# Patient Record
Sex: FEMALE | Race: WHITE | NOT HISPANIC OR LATINO | URBAN - NONMETROPOLITAN AREA
[De-identification: names, ages, dates, MRNs, and addresses within clinical notes are randomized per-mention and may not be internally consistent; named-entity substitution may affect disease eponyms.]

---
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
## 2022-04-27 ENCOUNTER — OFFICE VISIT (OUTPATIENT)
Dept: MEDICAL GROUP | Facility: CLINIC | Age: 22
End: 2022-04-27
Payer: OTHER MISCELLANEOUS

## 2022-04-27 VITALS
DIASTOLIC BLOOD PRESSURE: 74 MMHG | BODY MASS INDEX: 34.49 KG/M2 | TEMPERATURE: 97.7 F | OXYGEN SATURATION: 97 % | HEIGHT: 65 IN | HEART RATE: 97 BPM | RESPIRATION RATE: 20 BRPM | SYSTOLIC BLOOD PRESSURE: 118 MMHG | WEIGHT: 207 LBS

## 2022-04-27 DIAGNOSIS — Z30.41 ENCOUNTER FOR SURVEILLANCE OF CONTRACEPTIVE PILLS: ICD-10-CM

## 2022-04-27 DIAGNOSIS — R21 RASH: ICD-10-CM

## 2022-04-27 PROCEDURE — 99203 OFFICE O/P NEW LOW 30 MIN: CPT | Performed by: PHYSICIAN ASSISTANT

## 2022-04-27 RX ORDER — NORETHINDRONE ACETATE AND ETHINYL ESTRADIOL 1MG-20(21)
1 KIT ORAL
Qty: 90 TABLET | Refills: 3 | Status: SHIPPED | OUTPATIENT
Start: 2022-04-27 | End: 2023-03-06 | Stop reason: SDUPTHER

## 2022-04-27 RX ORDER — LORATADINE 10 MG/1
10 TABLET ORAL DAILY
Qty: 90 TABLET | Refills: 2 | Status: SHIPPED | OUTPATIENT
Start: 2022-04-27 | End: 2023-09-06

## 2022-04-27 RX ORDER — NORETHINDRONE ACETATE AND ETHINYL ESTRADIOL 1MG-20(21)
1 KIT ORAL
COMMUNITY
Start: 2022-03-28 | End: 2022-04-27 | Stop reason: SDUPTHER

## 2022-04-27 SDOH — HEALTH STABILITY: PHYSICAL HEALTH: ON AVERAGE, HOW MANY MINUTES DO YOU ENGAGE IN EXERCISE AT THIS LEVEL?: 10 MIN

## 2022-04-27 SDOH — ECONOMIC STABILITY: FOOD INSECURITY: WITHIN THE PAST 12 MONTHS, YOU WORRIED THAT YOUR FOOD WOULD RUN OUT BEFORE YOU GOT MONEY TO BUY MORE.: NEVER TRUE

## 2022-04-27 SDOH — ECONOMIC STABILITY: INCOME INSECURITY: IN THE LAST 12 MONTHS, WAS THERE A TIME WHEN YOU WERE NOT ABLE TO PAY THE MORTGAGE OR RENT ON TIME?: NO

## 2022-04-27 SDOH — ECONOMIC STABILITY: FOOD INSECURITY: WITHIN THE PAST 12 MONTHS, THE FOOD YOU BOUGHT JUST DIDN'T LAST AND YOU DIDN'T HAVE MONEY TO GET MORE.: NEVER TRUE

## 2022-04-27 SDOH — ECONOMIC STABILITY: HOUSING INSECURITY: IN THE LAST 12 MONTHS, HOW MANY PLACES HAVE YOU LIVED?: 3

## 2022-04-27 SDOH — HEALTH STABILITY: PHYSICAL HEALTH: ON AVERAGE, HOW MANY DAYS PER WEEK DO YOU ENGAGE IN MODERATE TO STRENUOUS EXERCISE (LIKE A BRISK WALK)?: 1 DAY

## 2022-04-27 SDOH — ECONOMIC STABILITY: HOUSING INSECURITY
IN THE LAST 12 MONTHS, WAS THERE A TIME WHEN YOU DID NOT HAVE A STEADY PLACE TO SLEEP OR SLEPT IN A SHELTER (INCLUDING NOW)?: NO

## 2022-04-27 SDOH — ECONOMIC STABILITY: INCOME INSECURITY: HOW HARD IS IT FOR YOU TO PAY FOR THE VERY BASICS LIKE FOOD, HOUSING, MEDICAL CARE, AND HEATING?: NOT VERY HARD

## 2022-04-27 SDOH — ECONOMIC STABILITY: TRANSPORTATION INSECURITY
IN THE PAST 12 MONTHS, HAS THE LACK OF TRANSPORTATION KEPT YOU FROM MEDICAL APPOINTMENTS OR FROM GETTING MEDICATIONS?: NO

## 2022-04-27 SDOH — HEALTH STABILITY: MENTAL HEALTH
STRESS IS WHEN SOMEONE FEELS TENSE, NERVOUS, ANXIOUS, OR CAN'T SLEEP AT NIGHT BECAUSE THEIR MIND IS TROUBLED. HOW STRESSED ARE YOU?: PATIENT DECLINED

## 2022-04-27 SDOH — ECONOMIC STABILITY: TRANSPORTATION INSECURITY
IN THE PAST 12 MONTHS, HAS LACK OF TRANSPORTATION KEPT YOU FROM MEETINGS, WORK, OR FROM GETTING THINGS NEEDED FOR DAILY LIVING?: NO

## 2022-04-27 SDOH — ECONOMIC STABILITY: TRANSPORTATION INSECURITY
IN THE PAST 12 MONTHS, HAS LACK OF RELIABLE TRANSPORTATION KEPT YOU FROM MEDICAL APPOINTMENTS, MEETINGS, WORK OR FROM GETTING THINGS NEEDED FOR DAILY LIVING?: NO

## 2022-04-27 ASSESSMENT — SOCIAL DETERMINANTS OF HEALTH (SDOH)
IN A TYPICAL WEEK, HOW MANY TIMES DO YOU TALK ON THE PHONE WITH FAMILY, FRIENDS, OR NEIGHBORS?: MORE THAN THREE TIMES A WEEK
DO YOU BELONG TO ANY CLUBS OR ORGANIZATIONS SUCH AS CHURCH GROUPS UNIONS, FRATERNAL OR ATHLETIC GROUPS, OR SCHOOL GROUPS?: NO
HOW OFTEN DO YOU GET TOGETHER WITH FRIENDS OR RELATIVES?: MORE THAN THREE TIMES A WEEK
HOW OFTEN DO YOU ATTEND CHURCH OR RELIGIOUS SERVICES?: 1 TO 4 TIMES PER YEAR
WITHIN THE PAST 12 MONTHS, YOU WORRIED THAT YOUR FOOD WOULD RUN OUT BEFORE YOU GOT THE MONEY TO BUY MORE: NEVER TRUE
ARE YOU MARRIED, WIDOWED, DIVORCED, SEPARATED, NEVER MARRIED, OR LIVING WITH A PARTNER?: PATIENT DECLINED
HOW OFTEN DO YOU ATTEND CHURCH OR RELIGIOUS SERVICES?: 1 TO 4 TIMES PER YEAR
HOW OFTEN DO YOU HAVE SIX OR MORE DRINKS ON ONE OCCASION: NEVER
HOW OFTEN DO YOU ATTENT MEETINGS OF THE CLUB OR ORGANIZATION YOU BELONG TO?: NEVER
HOW OFTEN DO YOU HAVE A DRINK CONTAINING ALCOHOL: NEVER
DO YOU BELONG TO ANY CLUBS OR ORGANIZATIONS SUCH AS CHURCH GROUPS UNIONS, FRATERNAL OR ATHLETIC GROUPS, OR SCHOOL GROUPS?: NO
IN A TYPICAL WEEK, HOW MANY TIMES DO YOU TALK ON THE PHONE WITH FAMILY, FRIENDS, OR NEIGHBORS?: MORE THAN THREE TIMES A WEEK
HOW OFTEN DO YOU ATTENT MEETINGS OF THE CLUB OR ORGANIZATION YOU BELONG TO?: NEVER
HOW HARD IS IT FOR YOU TO PAY FOR THE VERY BASICS LIKE FOOD, HOUSING, MEDICAL CARE, AND HEATING?: NOT VERY HARD
ARE YOU MARRIED, WIDOWED, DIVORCED, SEPARATED, NEVER MARRIED, OR LIVING WITH A PARTNER?: PATIENT DECLINED
HOW OFTEN DO YOU GET TOGETHER WITH FRIENDS OR RELATIVES?: MORE THAN THREE TIMES A WEEK
HOW MANY DRINKS CONTAINING ALCOHOL DO YOU HAVE ON A TYPICAL DAY WHEN YOU ARE DRINKING: PATIENT DECLINED

## 2022-04-27 ASSESSMENT — PATIENT HEALTH QUESTIONNAIRE - PHQ9: CLINICAL INTERPRETATION OF PHQ2 SCORE: 0

## 2022-04-27 ASSESSMENT — LIFESTYLE VARIABLES
HOW OFTEN DO YOU HAVE SIX OR MORE DRINKS ON ONE OCCASION: NEVER
HOW OFTEN DO YOU HAVE A DRINK CONTAINING ALCOHOL: NEVER
HOW MANY STANDARD DRINKS CONTAINING ALCOHOL DO YOU HAVE ON A TYPICAL DAY: PATIENT DECLINED

## 2022-04-28 NOTE — PROGRESS NOTES
"cc:  ocp    Subjective:     Isabella Dasilva is a 21 y.o. female presenting for ocp      Patient presents to the office for ocp.  Patient was seeing a gynecologist for ocp medication for birth control and a previous history of an ovarian cyst.  She is not wanting to return but is needing a refill of the medication. She does have 4 days left of her medication and she has not missed any of her doses.       Patient states that she has a rash on her hands.  It comes and goes.  She states that symptoms are intermittent.  She states that she will have tiny blisters on her hand.  They will pop and scab.  She tried her sisters medication for dermatitis.  At first it helped.  The second time it burned and became worse.  Baby lotion burned also.  Benadryl did help.  She does have sensitive skin.      Review of systems:  See above.   Denies any symptoms unless previously indicated.        Current Outpatient Medications:   •  BLISOVI FE 1/20 1-20 MG-MCG per tablet, Take 1 Tablet by mouth every day., Disp: 90 Tablet, Rfl: 3  •  loratadine (CLARITIN) 10 MG Tab, Take 1 Tablet by mouth every day., Disp: 90 Tablet, Rfl: 2    Allergies, past medical history, past surgical history, family history, social history reviewed and updated    Objective:     Vitals: /74 (BP Location: Left arm, Patient Position: Sitting, BP Cuff Size: Large adult)   Pulse 97   Temp 36.5 °C (97.7 °F) (Temporal)   Resp 20   Ht 1.638 m (5' 4.5\")   Wt 93.9 kg (207 lb)   SpO2 97%   BMI 34.98 kg/m²   General: Alert, pleasant, NAD  EYES:   PERRL, EOMI, no icterus or pallor.  Conjunctivae and lids normal.   HENT:  Normocephalic.  External ears normal. Neck supple.   Respiratory: Normal respiratory effort.    Abdomen:   obese  Skin: Warm, dry, no rashes. No rash on hands.  Pictures show red hands with swelling of fingers.  Musculoskeletal: Gait is normal.  Moves all extremities well.    Neurological: No tremors, sensation grossly intact, CN2-12 " intact.  Psych:  Affect/mood is normal, judgement is good, memory is intact, grooming is appropriate.    Assessment/Plan:     Isabella was seen today for establish care, rash and medication refill.    Diagnoses and all orders for this visit:    Encounter for surveillance of contraceptive pills  -     BLISOVI FE 1/20 1-20 MG-MCG per tablet; Take 1 Tablet by mouth every day.    Patient has not missed any doses of her medication.  She still has 4 days of medication left.  We will refill at this time.    Rash  -     loratadine (CLARITIN) 10 MG Tab; Take 1 Tablet by mouth every day.  -     FOOD #1 (61)  -     ALLERGY ZONE 15  -     CBC WITH DIFFERENTIAL; Future      Etiology of this rash is unknown.  We discussed multiple options including medication, lab testing, referrals, use of steroids.  At this time patient would like to proceed with labs and medication.  Our plan will be to follow-up in 2 to 4 weeks with test results and reevaluation with medication use and she will contact me sooner if needed.      Return in about 4 weeks (around 5/25/2022), or if symptoms worsen or fail to improve, for 2-4 weeks.  .    Please note that this dictation was created using voice recognition software. I have made every reasonable attempt to correct obvious errors, but expect that there are errors of grammar and possible content that I did not discover before finalizing note.

## 2022-05-26 ENCOUNTER — OFFICE VISIT (OUTPATIENT)
Dept: MEDICAL GROUP | Facility: CLINIC | Age: 22
End: 2022-05-26

## 2022-05-26 ENCOUNTER — TELEPHONE (OUTPATIENT)
Dept: MEDICAL GROUP | Facility: CLINIC | Age: 22
End: 2022-05-26

## 2022-05-26 VITALS
HEART RATE: 76 BPM | HEIGHT: 64 IN | DIASTOLIC BLOOD PRESSURE: 70 MMHG | SYSTOLIC BLOOD PRESSURE: 116 MMHG | RESPIRATION RATE: 16 BRPM | TEMPERATURE: 98.1 F | WEIGHT: 209 LBS | OXYGEN SATURATION: 95 % | BODY MASS INDEX: 35.68 KG/M2

## 2022-05-26 DIAGNOSIS — R21 RASH: ICD-10-CM

## 2022-05-26 PROCEDURE — 99212 OFFICE O/P EST SF 10 MIN: CPT | Performed by: PHYSICIAN ASSISTANT

## 2022-05-26 NOTE — TELEPHONE ENCOUNTER
[facility] [place] and are not in the chart.  I need to have lab results hopefully by the end of the day.

## 2022-05-26 NOTE — PROGRESS NOTES
"cc: Rash    Subjective:     Isabella Dasilva is a 21 y.o. female presenting for rash    Patient presents to the office for rash.  She states that taking the claritin did help.  It flaired up one more time but the claritin cleared the rash.     Review of systems:  See above.   Denies any symptoms unless previously indicated.        Current Outpatient Medications:   •  BLISOVI FE 1/20 1-20 MG-MCG per tablet, Take 1 Tablet by mouth every day., Disp: 90 Tablet, Rfl: 3  •  loratadine (CLARITIN) 10 MG Tab, Take 1 Tablet by mouth every day., Disp: 90 Tablet, Rfl: 2    Allergies, past medical history, past surgical history, family history, social history reviewed and updated    Objective:     Vitals: /70 (BP Location: Left arm, Patient Position: Sitting, BP Cuff Size: Large adult)   Pulse 76   Temp 36.7 °C (98.1 °F) (Temporal)   Resp 16   Ht 1.626 m (5' 4\")   Wt 94.8 kg (209 lb)   LMP 05/25/2022 (Exact Date)   SpO2 95%   Breastfeeding No   BMI 35.87 kg/m²   General: Alert, pleasant, NAD  EYES:   PERRL, EOMI, no icterus or pallor.  Conjunctivae and lids normal.   HENT:  Normocephalic.  External ears normal.  Neck supple.  Respiratory: Normal respiratory effort.    Abdomen: Not obese  Skin: Warm, dry, no rashes.  Musculoskeletal: Gait is normal.  Moves all extremities well.    Extremities: normal range of motion all extremities.   Neurological: No tremors, sensation grossly intact, CN2-12 intact.  Psych:  Affect/mood is normal, judgement is good, memory is intact, grooming is appropriate.    Assessment/Plan:     Isabella was seen today for rash.    Diagnoses and all orders for this visit:    Rash    Improved with Claritin.  Continue with use as needed.  She can continue to take on a daily basis.  However her symptoms are not improving with Claritin, recommend referral to allergist.  Patient did have lab work done.  It is not currently in the chart.  Therefore we will try to notify her hopefully by the end of the " day with the test results.        Return if symptoms worsen or fail to improve.    Please note that this dictation was created using voice recognition software. I have made every reasonable attempt to correct obvious errors, but expect that there are errors of grammar and possible content that I did not discover before finalizing note.

## 2023-01-16 ENCOUNTER — APPOINTMENT (OUTPATIENT)
Dept: MEDICAL GROUP | Facility: CLINIC | Age: 23
End: 2023-01-16
Payer: COMMERCIAL

## 2023-01-18 ENCOUNTER — OFFICE VISIT (OUTPATIENT)
Dept: MEDICAL GROUP | Facility: CLINIC | Age: 23
End: 2023-01-18
Payer: COMMERCIAL

## 2023-01-18 VITALS
HEIGHT: 64 IN | HEART RATE: 91 BPM | RESPIRATION RATE: 16 BRPM | SYSTOLIC BLOOD PRESSURE: 118 MMHG | BODY MASS INDEX: 37.46 KG/M2 | OXYGEN SATURATION: 95 % | TEMPERATURE: 98 F | WEIGHT: 219.4 LBS | DIASTOLIC BLOOD PRESSURE: 72 MMHG

## 2023-01-18 DIAGNOSIS — R05.9 COUGH IN ADULT: ICD-10-CM

## 2023-01-18 DIAGNOSIS — J02.0 STREP PHARYNGITIS: ICD-10-CM

## 2023-01-18 LAB
EXTERNAL QUALITY CONTROL: NORMAL
FLUAV+FLUBV AG SPEC QL IA: NEGATIVE
INT CON NEG: NORMAL
INT CON POS: NORMAL
S PYO AG THROAT QL: POSITIVE
SARS-COV+SARS-COV-2 AG RESP QL IA.RAPID: NEGATIVE

## 2023-01-18 PROCEDURE — 87880 STREP A ASSAY W/OPTIC: CPT | Performed by: PHYSICIAN ASSISTANT

## 2023-01-18 PROCEDURE — 87426 SARSCOV CORONAVIRUS AG IA: CPT | Performed by: PHYSICIAN ASSISTANT

## 2023-01-18 PROCEDURE — 99213 OFFICE O/P EST LOW 20 MIN: CPT | Mod: CS | Performed by: PHYSICIAN ASSISTANT

## 2023-01-18 PROCEDURE — 87804 INFLUENZA ASSAY W/OPTIC: CPT | Performed by: PHYSICIAN ASSISTANT

## 2023-01-18 RX ORDER — AZITHROMYCIN 250 MG/1
TABLET, FILM COATED ORAL
Qty: 6 TABLET | Refills: 0 | Status: SHIPPED
Start: 2023-01-18 | End: 2023-08-07

## 2023-01-18 ASSESSMENT — PATIENT HEALTH QUESTIONNAIRE - PHQ9: CLINICAL INTERPRETATION OF PHQ2 SCORE: 0

## 2023-01-18 NOTE — LETTER
January 18, 2023       Patient: Isabella Dasilva   YOB: 2000   Date of Visit: 1/18/2023         To Whom It May Concern:    In my medical opinion, I recommend that Isabella Dasilva remain out of work until 1-    If you have any questions or concerns, please don't hesitate to call [phone]          Sincerely,          Yojana Roman P.A.-C.  Electronically Signed

## 2023-01-19 NOTE — PROGRESS NOTES
"cc:  cough    Subjective:     Isabella Dasilva is a 22 y.o. female presenting for cough      Patient presents to the office for cough.  She has also had mucus for one month.  Patient denies having a sore throat.  Patient states that she has had a cough since before Christmas.  She feels that her cough is improving. She denies fever and chills.  She states it has been congestion and cough only.     Review of systems:  See above.   Denies any symptoms unless previously indicated.        Current Outpatient Medications:     azithromycin (ZITHROMAX) 250 MG Tab, Take 2 pills on day one and 1 tab days 2-5, Disp: 6 Tablet, Rfl: 0    BLISOVI FE 1/20 1-20 MG-MCG per tablet, Take 1 Tablet by mouth every day., Disp: 90 Tablet, Rfl: 3    loratadine (CLARITIN) 10 MG Tab, Take 1 Tablet by mouth every day. (Patient not taking: Reported on 1/18/2023), Disp: 90 Tablet, Rfl: 2    Allergies, past medical history, past surgical history, family history, social history reviewed and updated    Objective:     Vitals: /72 (BP Location: Left arm, Patient Position: Sitting, BP Cuff Size: Adult)   Pulse 91   Temp 36.7 °C (98 °F) (Temporal)   Resp 16   Ht 1.626 m (5' 4\")   Wt 99.5 kg (219 lb 6.4 oz)   LMP 01/10/2023 (Exact Date)   SpO2 95%   BMI 37.66 kg/m²   General: Alert, pleasant, NAD  EYES:   PERRL, EOMI, no icterus or pallor.  Conjunctivae and lids normal.   HENT:  Normocephalic.  External ears normal.  Oropharynx non-erythematous, mucous membranes moist.  Neck supple.    Respiratory: Normal respiratory effort.    Abdomen: obese.  Skin: Warm, dry, no rashes.  Musculoskeletal: Gait is normal.  Moves all extremities well.    Extremities: normal range of motion all extremities. .   Neurological: No tremors, sensation grossly intact,  CN2-12 intact.  Psych:  Affect/mood is normal, judgement is good, memory is intact, grooming is appropriate.  Rapid strep: positive  Rapid flu:  negative  Rapid covid:  negative    Assessment/Plan: "     Isabella was seen today for cough.    Diagnoses and all orders for this visit:    Strep pharyngitis  -     azithromycin (ZITHROMAX) 250 MG Tab; Take 2 pills on day one and 1 tab days 2-5    Cough in adult  -     POCT SARS-COV Antigen LES (Symptomatic Only)  -     POCT Influenza A/B  -     POCT Rapid Strep A  -     azithromycin (ZITHROMAX) 250 MG Tab; Take 2 pills on day one and 1 tab days 2-5      Positive for strep.  The strain has been resistant to penicillin and so we will try Z-Tony.  Patient to contact office if no improvement in the next 48 to 72 hours with antibiotic.  Note to remain out of work for next 48 hours.      No follow-ups on file.    Please note that this dictation was created using voice recognition software. I have made every reasonable attempt to correct obvious errors, but expect that there are errors of grammar and possible content that I did not discover before finalizing note.

## 2023-03-06 DIAGNOSIS — Z30.41 ENCOUNTER FOR SURVEILLANCE OF CONTRACEPTIVE PILLS: ICD-10-CM

## 2023-03-06 RX ORDER — NORETHINDRONE ACETATE AND ETHINYL ESTRADIOL 1MG-20(21)
1 KIT ORAL
Qty: 90 TABLET | Refills: 1 | Status: SHIPPED | OUTPATIENT
Start: 2023-03-06 | End: 2023-09-11

## 2023-03-06 NOTE — TELEPHONE ENCOUNTER
Received request via: Patient    Was the patient seen in the last year in this department? Yes    Does the patient have an active prescription (recently filled or refills available) for medication(s) requested? No    Does the patient have [facility] Plus and need 100 day supply (blood pressure, diabetes and cholesterol meds only)? Medication is not for cholesterol, blood pressure or diabetes    Last OV: 1/18/23  Last labs: N/A

## 2023-09-06 ENCOUNTER — OFFICE VISIT (OUTPATIENT)
Dept: URGENT CARE | Facility: CLINIC | Age: 23
End: 2023-09-06
Payer: COMMERCIAL

## 2023-09-06 VITALS
HEIGHT: 63 IN | SYSTOLIC BLOOD PRESSURE: 124 MMHG | RESPIRATION RATE: 14 BRPM | DIASTOLIC BLOOD PRESSURE: 86 MMHG | WEIGHT: 229.7 LBS | OXYGEN SATURATION: 100 % | HEART RATE: 76 BPM | BODY MASS INDEX: 40.7 KG/M2 | TEMPERATURE: 97.5 F

## 2023-09-06 DIAGNOSIS — R30.0 DYSURIA: ICD-10-CM

## 2023-09-06 DIAGNOSIS — N30.90 CYSTITIS: ICD-10-CM

## 2023-09-06 LAB
APPEARANCE UR: NORMAL
BILIRUB UR STRIP-MCNC: NEGATIVE MG/DL
COLOR UR AUTO: NORMAL
GLUCOSE UR STRIP.AUTO-MCNC: NEGATIVE MG/DL
KETONES UR STRIP.AUTO-MCNC: NEGATIVE MG/DL
LEUKOCYTE ESTERASE UR QL STRIP.AUTO: NORMAL
NITRITE UR QL STRIP.AUTO: NEGATIVE
PH UR STRIP.AUTO: 6.5 [PH] (ref 5–8)
POCT INT CON NEG: NEGATIVE
POCT INT CON POS: POSITIVE
POCT URINE PREGNANCY TEST: NEGATIVE
PROT UR QL STRIP: NORMAL MG/DL
RBC UR QL AUTO: NORMAL
SP GR UR STRIP.AUTO: 1.02
UROBILINOGEN UR STRIP-MCNC: 1 MG/DL

## 2023-09-06 PROCEDURE — 3079F DIAST BP 80-89 MM HG: CPT

## 2023-09-06 PROCEDURE — 81002 URINALYSIS NONAUTO W/O SCOPE: CPT

## 2023-09-06 PROCEDURE — 99213 OFFICE O/P EST LOW 20 MIN: CPT

## 2023-09-06 PROCEDURE — 81025 URINE PREGNANCY TEST: CPT

## 2023-09-06 PROCEDURE — 3074F SYST BP LT 130 MM HG: CPT

## 2023-09-06 RX ORDER — PHENAZOPYRIDINE HYDROCHLORIDE 200 MG/1
200 TABLET, FILM COATED ORAL 3 TIMES DAILY PRN
Qty: 6 TABLET | Refills: 0 | Status: SHIPPED | OUTPATIENT
Start: 2023-09-06 | End: 2024-01-24

## 2023-09-06 RX ORDER — NITROFURANTOIN 25; 75 MG/1; MG/1
100 CAPSULE ORAL 2 TIMES DAILY
Qty: 10 CAPSULE | Refills: 0 | Status: SHIPPED | OUTPATIENT
Start: 2023-09-06 | End: 2023-09-11

## 2023-09-06 NOTE — PROGRESS NOTES
Chief Complaint   Patient presents with    UTI     Cloudy, blood in urine, burns, frequency x yesterday        HISTORY OF PRESENT ILLNESS: Patient is a pleasant 22 y.o. female who presents to urgent care today for frequency, urgency and pain with urination for the last 2 days.  Patient denies any low back pain, no nausea or vomiting, no noted fevers.  She also notes some blood in her urine.  Patient has no previous history of UTI, she denies any current pregnancy.    Patient Active Problem List    Diagnosis Date Noted    Rash 04/27/2022    Encounter for surveillance of contraceptive pills 04/27/2022       Allergies:Patient has no known allergies.    Current Outpatient Medications Ordered in Epic   Medication Sig Dispense Refill    nitrofurantoin (MACROBID) 100 MG Cap Take 1 Capsule by mouth 2 times a day for 5 days. 10 Capsule 0    phenazopyridine (PYRIDIUM) 200 MG Tab Take 1 Tablet by mouth 3 times a day as needed for Mild Pain. 6 Tablet 0    BLISOVI FE 1/20 1-20 MG-MCG per tablet Take 1 Tablet by mouth every day. 90 Tablet 1     No current Epic-ordered facility-administered medications on file.       Past Medical History:   Diagnosis Date    Ovarian cyst        Social History     Tobacco Use    Smoking status: Never    Smokeless tobacco: Never   Vaping Use    Vaping Use: Some days    Substances: Nicotine   Substance Use Topics    Alcohol use: Not Currently     Comment: occ    Drug use: Never       Family Status   Relation Name Status    Mo  Alive    Fa  Alive   No family history on file.    ROS:  Review of Systems   Constitutional: Negative for fever, chills, weight loss, malaise, and fatigue.   Neuro: Negative for headache, sensory changes, weakness, seizure, LOC.   Cardiovascular: Negative for chest pain, palpitations, orthopnea and leg swelling.   Respiratory: Negative for cough, sputum production, shortness of breath and wheezing.   Gastrointestinal: Negative for abdominal pain, nausea, vomiting or diarrhea.  "  Genitourinary: Positive for dysuria, urgency and frequency.  Skin: Negative for rash, diaphoresis.     Exam:  /86 (BP Location: Right arm, Patient Position: Sitting, BP Cuff Size: Adult)   Pulse 76   Temp 36.4 °C (97.5 °F) (Temporal)   Resp 14   Ht 1.6 m (5' 3\")   Wt 104 kg (229 lb 11.2 oz)   SpO2 100%   General: well-nourished, well-developed female in NAD  Head: normocephalic, atraumatic  Eyes: PERRLA, no conjunctival injection, acuity grossly intact, lids normal.   Mouth/Throat: reasonable hygiene.  Lymph: no cervical adenopathy. No supraclavicular adenopathy.   Neuro: alert and oriented. Cranial nerves 1-12 grossly intact. No sensory deficit.   Cardiovascular: regular rate and rhythm. No edema.  Pulmonary: no distress. Chest is symmetrical with respiration, no wheezes, crackles, or rhonchi.   Abdomen: soft, tenderness noted to the left lower quadrant with light palpation, non-tender all other quadrants., no guarding, no hepatosplenomegaly.  Negative CVA tenderness.  Musculoskeletal: no clubbing, appropriate muscle tone, gait is stable.  Skin: warm, dry, intact, no clubbing, no cyanosis, no rashes.   Psych: appropriate mood, affect, judgement.       Assessment/Plan:  1. Dysuria  POCT Urinalysis    phenazopyridine (PYRIDIUM) 200 MG Tab    POCT Pregnancy      2. Cystitis  nitrofurantoin (MACROBID) 100 MG Cap      Patient is a pleasant 22 y.o. female who presents to urgent care today for frequency, urgency and pain with urination for the last 2 days.  Patient denies any low back pain, no nausea or vomiting, no noted fevers.  She also notes some blood in her urine.  Patient has no previous history of UTI, she denies any current pregnancy.  Physical exam negative CVA tenderness.  She does have some noted tenderness to the left lower quadrant.  Physical exam is otherwise within normal limits today.  POCT urinalysis shows positive blood along with leukocytes.  POCT pregnancy was negative.  Patient placed " on Pyridium for comfort measures and Macrobid.  Notified that her urine may be bright orange, advised to drink plenty of fluids, Motrin or Tylenol for any ongoing discomfort.  Advised to follow-up if she continues to get worse or does not improve.      Supportive care, differential diagnoses, and indications for immediate follow-up discussed with patient.   Pathogenesis of diagnosis discussed including typical length and natural progression.   Instructed to return to clinic or nearest emergency department for any change in condition, further concerns, or worsening of symptoms.  Patient states understanding of the plan of care and discharge instructions.  Instructed to make an appointment, for follow up, with primary care provider.      Please note that this dictation was created using voice recognition software. I have made every reasonable attempt to correct obvious errors, but I expect that there are errors of grammar and possibly content that I did not discover before finalizing the note.      Roxanna MCKEON

## 2023-09-10 DIAGNOSIS — Z30.41 ENCOUNTER FOR SURVEILLANCE OF CONTRACEPTIVE PILLS: ICD-10-CM

## 2023-09-11 RX ORDER — NORETHINDRONE ACETATE AND ETHINYL ESTRADIOL 1MG-20(21)
1 KIT ORAL DAILY
Qty: 84 TABLET | Refills: 0 | Status: SHIPPED | OUTPATIENT
Start: 2023-09-11 | End: 2023-12-06 | Stop reason: SDUPTHER

## 2023-09-11 NOTE — TELEPHONE ENCOUNTER
Was the patient seen in the last year in this department? Yes    Does patient have an active prescription for medications requested? Yes    Received Request Via: Pharmacy    Office Visit on 09/06/2023   Component Date Value    POC Color 09/06/2023 DARK YELLOW     POC Appearance 09/06/2023 CLOUDY     POC Glucose 09/06/2023 NEGATIVE     POC Bilirubin 09/06/2023 NEGATIVE     POC Ketones 09/06/2023 NEGATIVE     POC Specific Gravity 09/06/2023 1.020     POC Blood 09/06/2023 MODERATE     POC Urine PH 09/06/2023 6.5     POC Protein 09/06/2023 30 MG/DL     POC Urobiligen 09/06/2023 1.0     POC Nitrites 09/06/2023 NEGATIVE     POC Leukocyte Esterase 09/06/2023 SMALL     POC Urine Pregnancy Test 09/06/2023 Negative     Internal Control Positive 09/06/2023 Positive     Internal Control Negative 09/06/2023 Negative    Office Visit on 01/18/2023   Component Date Value    Internal  01/18/2023 Valid     SARS-COV ANTIGEN LES 01/18/2023 Negative     Internal Control Positive 01/18/2023 Valid     Internal Control Negative 01/18/2023 Valid     Rapid Influenza A-B 01/18/2023 NEGATIVE     Internal Control Positive 01/18/2023 Valid     Internal Control Negative 01/18/2023 Valid     Rapid Strep Screen 01/18/2023 POSITIVE     Internal Control Positive 01/18/2023 Valid     Internal Control Negative 01/18/2023 Valid    ]  
02-Nov-2021 04:00

## 2023-12-06 DIAGNOSIS — Z30.41 ENCOUNTER FOR SURVEILLANCE OF CONTRACEPTIVE PILLS: ICD-10-CM

## 2023-12-08 RX ORDER — NORETHINDRONE ACETATE AND ETHINYL ESTRADIOL 1MG-20(21)
1 KIT ORAL DAILY
Qty: 30 TABLET | Refills: 0 | Status: SHIPPED | OUTPATIENT
Start: 2023-12-08 | End: 2024-01-24 | Stop reason: SDUPTHER

## 2023-12-08 NOTE — TELEPHONE ENCOUNTER
Was the patient seen in the last year in this department? Yes    Does patient have an active prescription for medications requested? Yes    Received Request Via: Patient    Office Visit on 09/06/2023   Component Date Value    POC Color 09/06/2023 DARK YELLOW     POC Appearance 09/06/2023 CLOUDY     POC Glucose 09/06/2023 NEGATIVE     POC Bilirubin 09/06/2023 NEGATIVE     POC Ketones 09/06/2023 NEGATIVE     POC Specific Gravity 09/06/2023 1.020     POC Blood 09/06/2023 MODERATE     POC Urine PH 09/06/2023 6.5     POC Protein 09/06/2023 30 MG/DL     POC Urobiligen 09/06/2023 1.0     POC Nitrites 09/06/2023 NEGATIVE     POC Leukocyte Esterase 09/06/2023 SMALL     POC Urine Pregnancy Test 09/06/2023 Negative     Internal Control Positive 09/06/2023 Positive     Internal Control Negative 09/06/2023 Negative    Office Visit on 01/18/2023   Component Date Value    Internal  01/18/2023 Valid     SARS-COV ANTIGEN LES 01/18/2023 Negative     Internal Control Positive 01/18/2023 Valid     Internal Control Negative 01/18/2023 Valid     Rapid Influenza A-B 01/18/2023 NEGATIVE     Internal Control Positive 01/18/2023 Valid     Internal Control Negative 01/18/2023 Valid     Rapid Strep Screen 01/18/2023 POSITIVE     Internal Control Positive 01/18/2023 Valid     Internal Control Negative 01/18/2023 Valid    ]

## 2024-01-24 ENCOUNTER — OFFICE VISIT (OUTPATIENT)
Dept: MEDICAL GROUP | Facility: CLINIC | Age: 24
End: 2024-01-24
Payer: COMMERCIAL

## 2024-01-24 VITALS
WEIGHT: 229.28 LBS | HEIGHT: 64 IN | RESPIRATION RATE: 20 BRPM | SYSTOLIC BLOOD PRESSURE: 118 MMHG | HEART RATE: 93 BPM | TEMPERATURE: 98.4 F | OXYGEN SATURATION: 94 % | BODY MASS INDEX: 39.14 KG/M2 | DIASTOLIC BLOOD PRESSURE: 66 MMHG

## 2024-01-24 DIAGNOSIS — R09.81 NASAL CONGESTION: ICD-10-CM

## 2024-01-24 DIAGNOSIS — Z11.59 NEED FOR HEPATITIS C SCREENING TEST: ICD-10-CM

## 2024-01-24 DIAGNOSIS — Z11.4 SCREENING FOR HIV (HUMAN IMMUNODEFICIENCY VIRUS): ICD-10-CM

## 2024-01-24 DIAGNOSIS — Z23 NEED FOR VACCINATION: ICD-10-CM

## 2024-01-24 DIAGNOSIS — Z30.41 ENCOUNTER FOR SURVEILLANCE OF CONTRACEPTIVE PILLS: ICD-10-CM

## 2024-01-24 LAB
POCT INT CON NEG: NEGATIVE
POCT INT CON POS: POSITIVE
POCT URINE PREGNANCY TEST: NEGATIVE

## 2024-01-24 PROCEDURE — 81025 URINE PREGNANCY TEST: CPT | Performed by: PHYSICIAN ASSISTANT

## 2024-01-24 PROCEDURE — 90471 IMMUNIZATION ADMIN: CPT | Performed by: PHYSICIAN ASSISTANT

## 2024-01-24 PROCEDURE — 90472 IMMUNIZATION ADMIN EACH ADD: CPT | Performed by: PHYSICIAN ASSISTANT

## 2024-01-24 PROCEDURE — 3078F DIAST BP <80 MM HG: CPT | Performed by: PHYSICIAN ASSISTANT

## 2024-01-24 PROCEDURE — 99213 OFFICE O/P EST LOW 20 MIN: CPT | Mod: 25 | Performed by: PHYSICIAN ASSISTANT

## 2024-01-24 PROCEDURE — 3074F SYST BP LT 130 MM HG: CPT | Performed by: PHYSICIAN ASSISTANT

## 2024-01-24 PROCEDURE — 90715 TDAP VACCINE 7 YRS/> IM: CPT | Performed by: PHYSICIAN ASSISTANT

## 2024-01-24 PROCEDURE — 90686 IIV4 VACC NO PRSV 0.5 ML IM: CPT | Performed by: PHYSICIAN ASSISTANT

## 2024-01-24 RX ORDER — NORETHINDRONE ACETATE AND ETHINYL ESTRADIOL 1MG-20(21)
1 KIT ORAL DAILY
Qty: 90 TABLET | Refills: 2 | Status: SHIPPED | OUTPATIENT
Start: 2024-01-24

## 2024-01-24 ASSESSMENT — PATIENT HEALTH QUESTIONNAIRE - PHQ9: CLINICAL INTERPRETATION OF PHQ2 SCORE: 0

## 2024-01-24 NOTE — PROGRESS NOTES
"cc:  birth control    Subjective:     Isabella Dasilva is a 23 y.o. female presenting for birth control      Patient presents to the office for birth control.  Patient is requesting to start her birth control.  She has been out of medication for 3 weeks.   Patient has a negative pregnancy test.  She states that she has been sexually active since being out of medication but has been using protection.  She is about to start her next period.      Patient states that she has had a cough for about a week.  She states that it is not improving.  Patient states that she does have allergies. She states that she does have post nasal drainage especially in the morning.     Patient is due for HIV and hepatitis c screening.  She is also due for vaccines.    Review of systems:  See above.   Denies any symptoms unless previously indicated.        Current Outpatient Medications:     BLISOVI FE 1/20 1-20 MG-MCG per tablet, Take 1 Tablet by mouth every day., Disp: 90 Tablet, Rfl: 2    Allergies, past medical history, past surgical history, family history, social history reviewed and updated    Objective:     Vitals: /66 (BP Location: Left arm, Patient Position: Sitting, BP Cuff Size: Adult)   Pulse 93   Temp 36.9 °C (98.4 °F) (Temporal)   Resp 20   Ht 1.636 m (5' 4.4\")   Wt 104 kg (229 lb 4.5 oz)   LMP  (LMP Unknown)   SpO2 94%   BMI 38.87 kg/m²   General: Alert, pleasant, NAD  EYES:   PERRL, EOMI, no icterus or pallor.  Conjunctivae and lids normal.   HENT:  Normocephalic.  External ears normal.  Mucous drainage oropharynx cavity.  Neck supple.    Heart: Regular rate and rhythm.  S1 and S2 normal.  No murmurs appreciated.  Respiratory: Normal respiratory effort.  Clear to auscultation bilaterally.  Abdomen: obese.  Skin: Warm, dry, no rashes.  Musculoskeletal: Gait is normal.  Moves all extremities well.    Extremities: Normal range of motion all extremities.   Neurological: No tremors, sensation grossly intact, "   CN2-12 intact.  Psych:  Affect/mood is normal, judgement is good, memory is intact, grooming is appropriate.    Assessment/Plan:     Isabella was seen today for medication refill.    Diagnoses and all orders for this visit:    Encounter for surveillance of contraceptive pills  -     POCT Pregnancy  -     BLISOVI FE 1/20 1-20 MG-MCG per tablet; Take 1 Tablet by mouth every day.    Negative pregnancy test.  Medication refilled at this time.  This patient is about to start her period, she will start this on the first day of her next menstrual cycle.    Nasal congestion    Lungs sound clear.  Recommend fluticasone nasal spray.  Patient indicates that she will purchase over-the-counter.    Screening for HIV (human immunodeficiency virus)  -     HIV AG/AB COMBO ASSAY SCREENING; Future  Need for hepatitis C screening test  -     HEP C VIRUS ANTIBODY; Future    Labs ordered to evaluate further.    Need for vaccination  -     Tdap Vaccine =>8YO IM  -     INFLUENZA VACCINE QUAD INJ (PF)    Vaccines given today.        No follow-ups on file.    Please note that this dictation was created using voice recognition software. I have made every reasonable attempt to correct obvious errors, but expect that there are errors of grammar and possible content that I did not discover before finalizing note.

## 2024-06-26 ENCOUNTER — HOSPITAL ENCOUNTER (OUTPATIENT)
Facility: MEDICAL CENTER | Age: 24
End: 2024-06-26
Attending: PHYSICIAN ASSISTANT
Payer: COMMERCIAL

## 2024-06-26 ENCOUNTER — APPOINTMENT (OUTPATIENT)
Dept: MEDICAL GROUP | Facility: CLINIC | Age: 24
End: 2024-06-26
Payer: COMMERCIAL

## 2024-06-26 VITALS
RESPIRATION RATE: 18 BRPM | SYSTOLIC BLOOD PRESSURE: 106 MMHG | WEIGHT: 228.18 LBS | DIASTOLIC BLOOD PRESSURE: 70 MMHG | TEMPERATURE: 98.1 F | HEART RATE: 83 BPM | OXYGEN SATURATION: 96 % | BODY MASS INDEX: 40.43 KG/M2 | HEIGHT: 63 IN

## 2024-06-26 DIAGNOSIS — R35.0 URINARY FREQUENCY: ICD-10-CM

## 2024-06-26 DIAGNOSIS — M26.629 TMJ PAIN DYSFUNCTION SYNDROME: ICD-10-CM

## 2024-06-26 DIAGNOSIS — Z83.3 FAMILY HISTORY OF DIABETES MELLITUS: ICD-10-CM

## 2024-06-26 DIAGNOSIS — R82.90 BAD ODOR OF URINE: ICD-10-CM

## 2024-06-26 DIAGNOSIS — R30.0 DYSURIA: ICD-10-CM

## 2024-06-26 DIAGNOSIS — R82.90 ABNORMAL URINE: ICD-10-CM

## 2024-06-26 LAB
APPEARANCE UR: CLEAR
BILIRUB UR STRIP-MCNC: NORMAL MG/DL
COLOR UR AUTO: YELLOW
GLUCOSE UR STRIP.AUTO-MCNC: NEGATIVE MG/DL
HBA1C MFR BLD: 5 % (ref ?–5.8)
KETONES UR STRIP.AUTO-MCNC: NEGATIVE MG/DL
LEUKOCYTE ESTERASE UR QL STRIP.AUTO: NEGATIVE
NITRITE UR QL STRIP.AUTO: NEGATIVE
PH UR STRIP.AUTO: 5.5 [PH] (ref 5–8)
POCT INT CON NEG: NEGATIVE
POCT INT CON POS: POSITIVE
PROT UR QL STRIP: 30 MG/DL
RBC UR QL AUTO: NORMAL
SP GR UR STRIP.AUTO: 1.03
UROBILINOGEN UR STRIP-MCNC: 0.2 MG/DL

## 2024-06-26 PROCEDURE — 99214 OFFICE O/P EST MOD 30 MIN: CPT | Performed by: PHYSICIAN ASSISTANT

## 2024-06-26 PROCEDURE — 83036 HEMOGLOBIN GLYCOSYLATED A1C: CPT | Performed by: PHYSICIAN ASSISTANT

## 2024-06-26 PROCEDURE — 81002 URINALYSIS NONAUTO W/O SCOPE: CPT | Performed by: PHYSICIAN ASSISTANT

## 2024-06-26 PROCEDURE — 3078F DIAST BP <80 MM HG: CPT | Performed by: PHYSICIAN ASSISTANT

## 2024-06-26 PROCEDURE — 3074F SYST BP LT 130 MM HG: CPT | Performed by: PHYSICIAN ASSISTANT

## 2024-06-26 PROCEDURE — 87086 URINE CULTURE/COLONY COUNT: CPT

## 2024-06-26 RX ORDER — NITROFURANTOIN 25; 75 MG/1; MG/1
100 CAPSULE ORAL 2 TIMES DAILY
Qty: 14 CAPSULE | Refills: 0 | Status: SHIPPED | OUTPATIENT
Start: 2024-06-26 | End: 2024-07-03

## 2024-06-26 NOTE — PROGRESS NOTES
"cc:  dysuria    Subjective:     Isabella Dasilva is a 23 y.o. female presenting for dysuria        History of Present Illness  The patient presents for evaluation of multiple medical concerns.    The patient reports frequent urination, dehydration, and a malodorous urine, resembling acetone. She denies the presence of fever, chills, chest pain, or shortness of breath. She also denies any dysuria or hematuria.    The patient has been experiencing left-sided jaw pain for the past 3 days. The pain intensifies sporadically, particularly when lying on her jaw or during certain facial expressions. She does not frequently chew gum and does not believe she grinds her teeth.   She has a family history of diabetes.   The patient has no known allergies.       Review of systems:  See above.   Denies any symptoms unless previously indicated.        Current Outpatient Medications:     nitrofurantoin (MACROBID) 100 MG Cap, Take 1 Capsule by mouth 2 times a day for 7 days., Disp: 14 Capsule, Rfl: 0    BLISOVI FE 1/20 1-20 MG-MCG per tablet, Take 1 Tablet by mouth every day., Disp: 90 Tablet, Rfl: 2    Allergies, past medical history, past surgical history, family history, social history reviewed and updated    Objective:     Vitals: /70 (BP Location: Left arm, Patient Position: Sitting, BP Cuff Size: Large adult)   Pulse 83   Temp 36.7 °C (98.1 °F) (Temporal)   Resp 18   Ht 1.6 m (5' 3\")   Wt 104 kg (228 lb 2.8 oz)   SpO2 96%   BMI 40.42 kg/m²   General: Alert, pleasant, NAD  EYES:   PERRL, EOMI, no icterus or pallor.  Conjunctivae and lids normal.   HENT:  Normocephalic.  External ears normal.  TMJ clicking right and left side.  Neck supple.    Respiratory: Normal respiratory effort.    Abdomen: obese  Skin: Warm, dry, no rashes.  Musculoskeletal: Gait is normal.  Moves all extremities well.    Extremities: normal range of motion all extremities..   Neurological: No tremors, sensation grossly intact, CN2-12 " intact.  Psych:  Affect/mood is normal, judgement is good, memory is intact, grooming is appropriate.      Results  Laboratory Studies  Blood and protein detected in urine.       Assessment/Plan:     Isabella was seen today for jaw pain and diabetes mellitus.    Diagnoses and all orders for this visit:    Abnormal urine  -     POCT Urinalysis  -     POCT  A1C  -     URINE CULTURE(NEW)  -     Lipid Profile; Future  -     Comp Metabolic Panel; Future  -     nitrofurantoin (MACROBID) 100 MG Cap; Take 1 Capsule by mouth 2 times a day for 7 days.  Dysuria  -     URINE CULTURE(NEW)  -     Lipid Profile; Future  -     Comp Metabolic Panel; Future  -     nitrofurantoin (MACROBID) 100 MG Cap; Take 1 Capsule by mouth 2 times a day for 7 days.  Bad odor of urine  -     Lipid Profile; Future  -     Comp Metabolic Panel; Future  -     nitrofurantoin (MACROBID) 100 MG Cap; Take 1 Capsule by mouth 2 times a day for 7 days.  Urinary frequency  -     Lipid Profile; Future  -     Comp Metabolic Panel; Future  -     nitrofurantoin (MACROBID) 100 MG Cap; Take 1 Capsule by mouth 2 times a day for 7 days.      TMJ pain dysfunction syndrome  -     Referral to Physical Therapy    Family history of diabetes mellitus  -     Lipid Profile; Future  -     Comp Metabolic Panel; Future        Assessment & Plan  1. Urinary tract infection.  The patient's urine analysis did not reveal any glucose levels, however, it did reveal blood and protein. A urine culture will be conducted to rule out infection. Routine lab work will be ordered, including cholesterol, liver and kidney function, glucose levels, thyroid, metabolic panel, and vitamin D levels. Nitrofurantoin has been prescribed, to be taken twice daily for a week.    2. Temporomandibular joint disorder.  A muscle relaxant and physical therapy have been prescribed.    Return in about 4 weeks (around 7/24/2024), or if symptoms worsen or fail to improve.    Please note that this dictation was  created using voice recognition software. I have made every reasonable attempt to correct obvious errors, but expect that there are errors of grammar and possible content that I did not discover before finalizing note.

## 2024-06-29 LAB
BACTERIA UR CULT: NORMAL
SIGNIFICANT IND 70042: NORMAL
SITE SITE: NORMAL
SOURCE SOURCE: NORMAL

## 2024-07-13 ENCOUNTER — HOSPITAL ENCOUNTER (OUTPATIENT)
Dept: LAB | Facility: MEDICAL CENTER | Age: 24
End: 2024-07-13
Attending: PHYSICIAN ASSISTANT
Payer: COMMERCIAL

## 2024-07-13 DIAGNOSIS — R30.0 DYSURIA: ICD-10-CM

## 2024-07-13 DIAGNOSIS — R82.90 BAD ODOR OF URINE: ICD-10-CM

## 2024-07-13 DIAGNOSIS — R82.90 ABNORMAL URINE: ICD-10-CM

## 2024-07-13 DIAGNOSIS — R35.0 URINARY FREQUENCY: ICD-10-CM

## 2024-07-13 DIAGNOSIS — Z83.3 FAMILY HISTORY OF DIABETES MELLITUS: ICD-10-CM

## 2024-07-13 LAB
ALBUMIN SERPL BCP-MCNC: 4.3 G/DL (ref 3.2–4.9)
ALBUMIN/GLOB SERPL: 1.2 G/DL
ALP SERPL-CCNC: 84 U/L (ref 30–99)
ALT SERPL-CCNC: 44 U/L (ref 2–50)
ANION GAP SERPL CALC-SCNC: 11 MMOL/L (ref 7–16)
AST SERPL-CCNC: 18 U/L (ref 12–45)
BILIRUB SERPL-MCNC: 0.5 MG/DL (ref 0.1–1.5)
BUN SERPL-MCNC: 14 MG/DL (ref 8–22)
CALCIUM ALBUM COR SERPL-MCNC: 9.7 MG/DL (ref 8.5–10.5)
CALCIUM SERPL-MCNC: 9.9 MG/DL (ref 8.5–10.5)
CHLORIDE SERPL-SCNC: 102 MMOL/L (ref 96–112)
CHOLEST SERPL-MCNC: 203 MG/DL (ref 100–199)
CO2 SERPL-SCNC: 24 MMOL/L (ref 20–33)
CREAT SERPL-MCNC: 0.75 MG/DL (ref 0.5–1.4)
FASTING STATUS PATIENT QL REPORTED: NORMAL
GFR SERPLBLD CREATININE-BSD FMLA CKD-EPI: 115 ML/MIN/1.73 M 2
GLOBULIN SER CALC-MCNC: 3.6 G/DL (ref 1.9–3.5)
GLUCOSE SERPL-MCNC: 76 MG/DL (ref 65–99)
HDLC SERPL-MCNC: 44 MG/DL
LDLC SERPL CALC-MCNC: 143 MG/DL
POTASSIUM SERPL-SCNC: 4.5 MMOL/L (ref 3.6–5.5)
PROT SERPL-MCNC: 7.9 G/DL (ref 6–8.2)
SODIUM SERPL-SCNC: 137 MMOL/L (ref 135–145)
TRIGL SERPL-MCNC: 79 MG/DL (ref 0–149)

## 2024-07-13 PROCEDURE — 80061 LIPID PANEL: CPT

## 2024-07-13 PROCEDURE — 36415 COLL VENOUS BLD VENIPUNCTURE: CPT

## 2024-07-13 PROCEDURE — 80053 COMPREHEN METABOLIC PANEL: CPT

## 2024-07-30 ENCOUNTER — OFFICE VISIT (OUTPATIENT)
Dept: MEDICAL GROUP | Facility: CLINIC | Age: 24
End: 2024-07-30
Payer: COMMERCIAL

## 2024-07-30 VITALS
OXYGEN SATURATION: 97 % | BODY MASS INDEX: 41.02 KG/M2 | TEMPERATURE: 97.9 F | SYSTOLIC BLOOD PRESSURE: 104 MMHG | RESPIRATION RATE: 18 BRPM | HEIGHT: 63 IN | DIASTOLIC BLOOD PRESSURE: 70 MMHG | HEART RATE: 94 BPM | WEIGHT: 231.48 LBS

## 2024-07-30 DIAGNOSIS — R35.0 URINARY FREQUENCY: ICD-10-CM

## 2024-07-30 DIAGNOSIS — E78.2 MIXED HYPERLIPIDEMIA: ICD-10-CM

## 2024-10-05 DIAGNOSIS — Z30.41 ENCOUNTER FOR SURVEILLANCE OF CONTRACEPTIVE PILLS: ICD-10-CM

## 2024-10-09 RX ORDER — NORETHINDRONE ACETATE AND ETHINYL ESTRADIOL AND FERROUS FUMARATE 1MG-20(21)
1 KIT ORAL DAILY
Qty: 84 TABLET | Refills: 2 | Status: SHIPPED | OUTPATIENT
Start: 2024-10-09

## 2025-02-12 ENCOUNTER — APPOINTMENT (OUTPATIENT)
Dept: MEDICAL GROUP | Facility: CLINIC | Age: 25
End: 2025-02-12
Payer: COMMERCIAL

## 2025-02-12 DIAGNOSIS — Z30.41 ENCOUNTER FOR SURVEILLANCE OF CONTRACEPTIVE PILLS: ICD-10-CM

## 2025-02-12 RX ORDER — NORETHINDRONE ACETATE AND ETHINYL ESTRADIOL 1MG-20(21)
1 KIT ORAL DAILY
Qty: 84 TABLET | Refills: 2 | Status: SHIPPED | OUTPATIENT
Start: 2025-02-12

## 2025-02-12 NOTE — TELEPHONE ENCOUNTER
Called pt to r/s appt 2/12/20205 as pcp is out of office, pended refill request and routed to provider for refills.    R/s appt to: 3/27/2025 w/Pap smear due to Grandma having cervical cancer

## 2025-03-27 ENCOUNTER — APPOINTMENT (OUTPATIENT)
Dept: MEDICAL GROUP | Facility: CLINIC | Age: 25
End: 2025-03-27
Payer: COMMERCIAL

## 2025-03-27 VITALS
TEMPERATURE: 97.5 F | DIASTOLIC BLOOD PRESSURE: 72 MMHG | BODY MASS INDEX: 37.5 KG/M2 | SYSTOLIC BLOOD PRESSURE: 102 MMHG | OXYGEN SATURATION: 96 % | RESPIRATION RATE: 16 BRPM | WEIGHT: 225.09 LBS | HEART RATE: 86 BPM | HEIGHT: 65 IN

## 2025-03-27 DIAGNOSIS — Z30.41 ENCOUNTER FOR SURVEILLANCE OF CONTRACEPTIVE PILLS: ICD-10-CM

## 2025-03-27 RX ORDER — NORETHINDRONE ACETATE AND ETHINYL ESTRADIOL AND FERROUS FUMARATE 1MG-20(21)
1 KIT ORAL DAILY
Qty: 84 TABLET | Refills: 3 | Status: SHIPPED | OUTPATIENT
Start: 2025-03-27

## 2025-03-27 ASSESSMENT — PATIENT HEALTH QUESTIONNAIRE - PHQ9: CLINICAL INTERPRETATION OF PHQ2 SCORE: 0

## 2025-03-27 NOTE — PROGRESS NOTES
"cc:  birth control    Subjective:     Isabella Dasilva is a 24 y.o. female presenting for birth control        History of Present Illness  The patient is a 24-year-old female who presents to the office today to refill birth control.    She reports that her current contraceptive method has been effective, with no missed doses. She utilizes a 90-day supply and employs an alarm system and mobile application for tracking purposes. Her last Pap smear was conducted prior to her initial consultation with me in 2022. Her menstrual cycle typically occurs at the transition between the end of one month and the beginning of the next.       Review of systems:  See above.   Denies any symptoms unless previously indicated.        Current Outpatient Medications:     norethindrone-ethinyl estradiol ([facility] 1/20) 1-20 MG-MCG per tablet, Take 1 Tablet by mouth every day., Disp: 84 Tablet, Rfl: 3    Allergies, past medical history, past surgical history, family history, social history reviewed and updated    Objective:     Vitals: /72 (BP Location: Left arm, Patient Position: Sitting, BP Cuff Size: Large adult)   Pulse 86   Temp 36.4 °C (97.5 °F) (Temporal)   Resp 16   Ht 1.651 m (5' 5\")   Wt 102 kg (225 lb 1.4 oz)   LMP 03/26/2025 (Exact Date)   SpO2 96%   BMI 37.46 kg/m²   General: Alert, pleasant, NAD  EYES:   PERRL, EOMI, no icterus or pallor.  Conjunctivae and lids normal.   HENT:  Normocephalic.  External ears normal.   Neck supple.    Respiratory: Normal respiratory effort.   Abdomen: Not obese   Skin: Warm, dry, no rashes.  Musculoskeletal: Gait is normal.  Moves all extremities well.    Extremities: normal range of motion all extremities.   Neurological: No tremors, sensation grossly intact, CN2-12 intact.  Psych:  Affect/mood is normal, judgement is good, memory is intact, grooming is appropriate.        Assessment/Plan:     Isabella was seen today for contraception.    Diagnoses and all orders for this " visit:    Encounter for surveillance of contraceptive pills  -     norethindrone-ethinyl estradiol (RUTHIE FE 1/20) 1-20 MG-MCG per tablet; Take 1 Tablet by mouth every day.        Assessment & Plan  1. Contraceptive management.  She reports that her current birth control is working well, and she has not missed any doses. A prescription for a 1-year supply of her current contraceptive method will be provided through [facility] pharmacy. She is advised to continue using her phone alarm and tracker to maintain adherence.    2. Health Maintenance.  Her last Pap smear was approximately 3 years ago, before her first visit in January 2022. To ensure insurance coverage, a Pap smear is scheduled for the second or third week of May 2025, which accounts for the 3-year plus 1-day requirement by some insurers.    Return for Pap smear in May.  about 2nd or 3rd week.  .    Please note that this dictation was created using voice recognition software. I have made every reasonable attempt to correct obvious errors, but expect that there are errors of grammar and possible content that I did not discover before finalizing note.

## 2025-05-12 ENCOUNTER — HOSPITAL ENCOUNTER (OUTPATIENT)
Facility: MEDICAL CENTER | Age: 25
End: 2025-05-12
Attending: PHYSICIAN ASSISTANT
Payer: COMMERCIAL

## 2025-05-12 ENCOUNTER — OFFICE VISIT (OUTPATIENT)
Dept: MEDICAL GROUP | Facility: CLINIC | Age: 25
End: 2025-05-12
Payer: COMMERCIAL

## 2025-05-12 VITALS
TEMPERATURE: 98 F | RESPIRATION RATE: 16 BRPM | DIASTOLIC BLOOD PRESSURE: 76 MMHG | WEIGHT: 220.24 LBS | HEART RATE: 94 BPM | SYSTOLIC BLOOD PRESSURE: 120 MMHG | HEIGHT: 63 IN | OXYGEN SATURATION: 95 % | BODY MASS INDEX: 39.02 KG/M2

## 2025-05-12 DIAGNOSIS — N76.0 ACUTE VAGINITIS: ICD-10-CM

## 2025-05-12 DIAGNOSIS — Z12.4 PAP SMEAR FOR CERVICAL CANCER SCREENING: ICD-10-CM

## 2025-05-12 PROCEDURE — 88142 CYTOPATH C/V THIN LAYER: CPT

## 2025-05-12 PROCEDURE — 87624 HPV HI-RISK TYP POOLED RSLT: CPT

## 2025-05-12 PROCEDURE — 99395 PREV VISIT EST AGE 18-39: CPT | Performed by: PHYSICIAN ASSISTANT

## 2025-05-12 PROCEDURE — 87491 CHLMYD TRACH DNA AMP PROBE: CPT

## 2025-05-12 PROCEDURE — 99000 SPECIMEN HANDLING OFFICE-LAB: CPT | Performed by: PHYSICIAN ASSISTANT

## 2025-05-12 PROCEDURE — 3078F DIAST BP <80 MM HG: CPT | Performed by: PHYSICIAN ASSISTANT

## 2025-05-12 PROCEDURE — 3074F SYST BP LT 130 MM HG: CPT | Performed by: PHYSICIAN ASSISTANT

## 2025-05-12 PROCEDURE — 87591 N.GONORRHOEAE DNA AMP PROB: CPT

## 2025-05-12 RX ORDER — FLUCONAZOLE 150 MG/1
TABLET ORAL
Qty: 2 TABLET | Refills: 2 | Status: SHIPPED | OUTPATIENT
Start: 2025-05-12

## 2025-05-12 NOTE — PROGRESS NOTES
"cc:  pap    Subjective:     Isabella Dasilva is a 24 y.o. female presenting for pap      Patient presents the office today for a Pap smear and denies any other symptoms or issues at this time.    Review of systems:  See above.   Denies any symptoms unless previously indicated.        Current Outpatient Medications:     norethindrone-ethinyl estradiol ([facility] 1/20) 1-20 MG-MCG per tablet, Take 1 Tablet by mouth every day., Disp: 84 Tablet, Rfl: 3    Allergies, past medical history, past surgical history, family history, social history reviewed and updated    Objective:     Vitals: /76 (BP Location: Left arm, Patient Position: Sitting, BP Cuff Size: Adult long)   Pulse 94   Temp 36.7 °C (98 °F) (Temporal)   Resp 16   Ht 1.6 m (5' 3\")   Wt 99.9 kg (220 lb 3.8 oz)   SpO2 95%   BMI 39.01 kg/m²      LMP  4-20-25  General: Alert, pleasant, NAD  EYES:   PERRL, EOMI, no icterus or pallor.  Conjunctivae and lids normal.   HENT:  Normocephalic.  External ears normal. Neck supple.   Heart: Regular rate and rhythm.  S1 and S2 normal.  No murmurs appreciated.  Respiratory: Normal respiratory effort.    Abdomen: obese.  Breasts:  exam not done  Pelvic exam: Normal external genitalia including urethral meatus.  Well supported, nontender urethra and bladder.  Vagina and cervix with white discharge no lesions.  No cervical motion tenderness.  Anteflexed uterus of normal size, shape, and consistency.  No adnexal masses or tenderness.     Skin: Warm, dry, no rashes.  Musculoskeletal: Gait is normal.  Moves all extremities well.    Neurological: No tremors, sensation grossly intact, CN2-12 intact.  Psych:  Affect/mood is normal, judgement is good, memory is intact, grooming is appropriate.    Assessment/Plan:     Isabella was seen today for gynecologic exam.    Diagnoses and all orders for this visit:    Pap smear for cervical cancer screening  Acute vaginitis    Cervical specimen collected will be sent to the lab for " analysis we will notify patient of results when received.  Patient did have a white discharge and she does indicate frequent yeast infections.  Therefore we will send Diflucan to her pharmacy on file with instructions on use of medication.  If patient has not heard regarding results in 2 weeks she is to let this office know.      Assessment & Plan      No follow-ups on file.    Please note that this dictation was created using voice recognition software. I have made every reasonable attempt to correct obvious errors, but expect that there are errors of grammar and possible content that I did not discover before finalizing note.

## 2025-05-13 DIAGNOSIS — Z12.4 PAP SMEAR FOR CERVICAL CANCER SCREENING: ICD-10-CM

## 2025-05-13 LAB
C TRACH DNA GENITAL QL NAA+PROBE: NEGATIVE
N GONORRHOEA DNA GENITAL QL NAA+PROBE: NEGATIVE
SPECIMEN SOURCE: NORMAL

## 2025-05-14 ENCOUNTER — RESULTS FOLLOW-UP (OUTPATIENT)
Dept: MEDICAL GROUP | Facility: CLINIC | Age: 25
End: 2025-05-14

## 2025-05-20 LAB
HPV I/H RISK 1 DNA SPEC QL NAA+PROBE: NOT DETECTED
SPECIMEN SOURCE: NORMAL
THINPREP PAP, CYTOLOGY NL11781: NORMAL

## 2025-05-21 ENCOUNTER — OFFICE VISIT (OUTPATIENT)
Dept: URGENT CARE | Facility: CLINIC | Age: 25
End: 2025-05-21
Payer: COMMERCIAL

## 2025-05-21 VITALS
DIASTOLIC BLOOD PRESSURE: 86 MMHG | SYSTOLIC BLOOD PRESSURE: 126 MMHG | HEIGHT: 63 IN | OXYGEN SATURATION: 99 % | RESPIRATION RATE: 14 BRPM | HEART RATE: 70 BPM | BODY MASS INDEX: 38.62 KG/M2 | TEMPERATURE: 97.3 F | WEIGHT: 218 LBS

## 2025-05-21 DIAGNOSIS — H61.21 IMPACTED CERUMEN OF RIGHT EAR: Primary | ICD-10-CM

## 2025-05-21 PROCEDURE — 3074F SYST BP LT 130 MM HG: CPT

## 2025-05-21 PROCEDURE — 3079F DIAST BP 80-89 MM HG: CPT

## 2025-05-21 PROCEDURE — 69209 REMOVE IMPACTED EAR WAX UNI: CPT

## 2025-05-21 NOTE — PROGRESS NOTES
"Chief Complaint   Patient presents with    Cerumen Impaction     R ear x Sunday        HISTORY OF PRESENT ILLNESS: Patient is a pleasant 24 y.o. female who presents to urgent care today noted right sided ear wax with decreased hearing.      Patient Active Problem List    Diagnosis Date Noted    Mixed hyperlipidemia 07/30/2024    Abnormal urine 06/26/2024    Dysuria 06/26/2024    Bad odor of urine 06/26/2024    Urinary frequency 06/26/2024    TMJ pain dysfunction syndrome 06/26/2024    Family history of diabetes mellitus 06/26/2024    Rash 04/27/2022    Encounter for surveillance of contraceptive pills 04/27/2022       Allergies:Patient has no known allergies.    Current Medications and Prescriptions Ordered in Epic[1]    Past Medical History[2]    Social History[3]    Family Status   Relation Name Status    Mo  Alive    Fa  Alive   No partnership data on file   No family history on file.    Review of Systems   HENT:  Positive for hearing loss.        Exam:  /86 (BP Location: Right arm, Patient Position: Sitting, BP Cuff Size: Adult long)   Pulse 70   Temp 36.3 °C (97.3 °F) (Temporal)   Resp 14   Ht 1.6 m (5' 3\")   Wt 98.9 kg (218 lb)   SpO2 99%   Physical Exam  Vitals reviewed.   Constitutional:       General: She is not in acute distress.     Appearance: Normal appearance. She is normal weight.   HENT:      Head: Normocephalic.      Right Ear: Tympanic membrane, ear canal and external ear normal. There is impacted cerumen.      Left Ear: Tympanic membrane, ear canal and external ear normal.      Nose: Nose normal.      Mouth/Throat:      Mouth: Mucous membranes are moist.      Pharynx: Oropharynx is clear.   Eyes:      Extraocular Movements: Extraocular movements intact.      Pupils: Pupils are equal, round, and reactive to light.   Cardiovascular:      Rate and Rhythm: Normal rate and regular rhythm.      Pulses: Normal pulses.   Pulmonary:      Effort: Pulmonary effort is normal.   Musculoskeletal:    "      General: Normal range of motion.      Cervical back: Normal range of motion.   Skin:     General: Skin is warm and dry.   Neurological:      General: No focal deficit present.      Mental Status: She is alert.   Psychiatric:         Mood and Affect: Mood normal.             Assessment/Plan:  1. Impacted cerumen of right ear    Based on physical exam along with review of systems we did irrigate patient's ear today.  Tympanic membrane was visualized after, within normal limits.  Patient tolerated the procedure with ease.  Please see procedure note below.    Supportive care, differential diagnoses, and indications for immediate follow-up discussed with patient.   Pathogenesis of diagnosis discussed including typical length and natural progression.   Instructed to return to clinic or nearest emergency department for any change in condition, further concerns, or worsening of symptoms.  Patient states understanding of the plan of care and discharge instructions.  Instructed to make an appointment, for follow up, with primary care provider.      Please note that this dictation was created using voice recognition software. I have made every reasonable attempt to correct obvious errors, but I expect that there are errors of grammar and possibly content that I did not discover before finalizing the note.      Roxanna MCKEON        [1]   Current Outpatient Medications Ordered in Epic   Medication Sig Dispense Refill    fluconazole (DIFLUCAN) 150 MG tablet Take one tab weekly for 2 weeks. 2 Tablet 2    norethindrone-ethinyl estradiol ([facility] 1/20) 1-20 MG-MCG per tablet Take 1 Tablet by mouth every day. 84 Tablet 3     No current Epic-ordered facility-administered medications on file.   [2]   Past Medical History:  Diagnosis Date    Ovarian cyst    [3]   Social History  Tobacco Use    Smoking status: Never    Smokeless tobacco: Never   Vaping Use    Vaping status: Former    Substances: Nicotine, Flavoring    Substance Use Topics    Alcohol use: Not Currently     Comment: occ    Drug use: Never

## 2025-05-21 NOTE — PROCEDURES
Ear Wax Removal    Date/Time: 5/21/2025 9:53 AM    Performed by: LOUIE Ibarra  Authorized by: LOUIE Ibarra    Anesthesia:  Local Anesthetic: none  Location details: right ear  Patient tolerance: patient tolerated the procedure well with no immediate complications  Procedure type: irrigation   Sedation:  Patient sedated: no

## 2025-06-11 ENCOUNTER — OFFICE VISIT (OUTPATIENT)
Dept: URGENT CARE | Facility: CLINIC | Age: 25
End: 2025-06-11
Payer: COMMERCIAL

## 2025-06-11 VITALS
DIASTOLIC BLOOD PRESSURE: 70 MMHG | TEMPERATURE: 97.6 F | BODY MASS INDEX: 38.62 KG/M2 | WEIGHT: 218 LBS | OXYGEN SATURATION: 96 % | HEIGHT: 63 IN | RESPIRATION RATE: 16 BRPM | SYSTOLIC BLOOD PRESSURE: 124 MMHG | HEART RATE: 88 BPM

## 2025-06-11 DIAGNOSIS — N30.01 ACUTE CYSTITIS WITH HEMATURIA: Primary | ICD-10-CM

## 2025-06-11 DIAGNOSIS — R39.9 UTI SYMPTOMS: ICD-10-CM

## 2025-06-11 LAB
APPEARANCE UR: CLEAR
BILIRUB UR STRIP-MCNC: NEGATIVE MG/DL
COLOR UR AUTO: YELLOW
GLUCOSE UR STRIP.AUTO-MCNC: NEGATIVE MG/DL
KETONES UR STRIP.AUTO-MCNC: NEGATIVE MG/DL
LEUKOCYTE ESTERASE UR QL STRIP.AUTO: NEGATIVE
NITRITE UR QL STRIP.AUTO: POSITIVE
PH UR STRIP.AUTO: 7 [PH] (ref 5–8)
POCT INT CON NEG: NEGATIVE
POCT INT CON POS: POSITIVE
POCT URINE PREGNANCY TEST: NEGATIVE
PROT UR QL STRIP: NEGATIVE MG/DL
RBC UR QL AUTO: NORMAL
SP GR UR STRIP.AUTO: 1.02
UROBILINOGEN UR STRIP-MCNC: 0.2 MG/DL

## 2025-06-11 PROCEDURE — 81025 URINE PREGNANCY TEST: CPT | Performed by: FAMILY MEDICINE

## 2025-06-11 PROCEDURE — 3074F SYST BP LT 130 MM HG: CPT | Performed by: FAMILY MEDICINE

## 2025-06-11 PROCEDURE — 99213 OFFICE O/P EST LOW 20 MIN: CPT | Performed by: FAMILY MEDICINE

## 2025-06-11 PROCEDURE — 3078F DIAST BP <80 MM HG: CPT | Performed by: FAMILY MEDICINE

## 2025-06-11 PROCEDURE — 81002 URINALYSIS NONAUTO W/O SCOPE: CPT | Performed by: FAMILY MEDICINE

## 2025-06-11 RX ORDER — NITROFURANTOIN 25; 75 MG/1; MG/1
100 CAPSULE ORAL EVERY 12 HOURS
Qty: 10 CAPSULE | Refills: 0 | Status: SHIPPED | OUTPATIENT
Start: 2025-06-11 | End: 2025-06-16

## 2025-06-11 NOTE — PROGRESS NOTES
"  Subjective:      24 y.o. female presents to urgent care for concerns of a bladder infection.  For the last week she has been experiencing increased urinary urgency, frequency, and dysuria.  No associated hematuria.  Bowel movements are regular and hard.  She drinks an average of 2L of water and 1-2 caffeinated beverages daily.  She is currently sexually active with one, male partner and they use OCPs for contraception.    She denies any other questions or concerns at this time.    Current problem list, medication, and past medical/surgical history were reviewed in Epic.    ROS  See HPI     Objective:      /70   Pulse 88   Temp 36.4 °C (97.6 °F)   Resp 16   Ht 1.6 m (5' 3\")   Wt 98.9 kg (218 lb)   SpO2 96%   BMI 38.62 kg/m²     Physical Exam  Constitutional:       General: She is not in acute distress.     Appearance: She is not diaphoretic.   Cardiovascular:      Rate and Rhythm: Normal rate and regular rhythm.      Heart sounds: Normal heart sounds.   Pulmonary:      Effort: Pulmonary effort is normal. No respiratory distress.      Breath sounds: Normal breath sounds.   Abdominal:      General: Bowel sounds are normal.      Palpations: Abdomen is soft.      Tenderness: There is no abdominal tenderness. There is no right CVA tenderness or left CVA tenderness.   Neurological:      Mental Status: She is alert.   Psychiatric:         Mood and Affect: Affect normal.         Judgment: Judgment normal.       Assessment/Plan:     1. Acute cystitis with hematuria (Primary)  2. UTI symptoms  hCG negative.  Urinalysis indicates infection.  Prescription for Macrobid has been sent.  She was encouraged to continue to stay well-hydrated.  - POCT Urinalysis  - POCT PREGNANCY  - nitrofurantoin (MACROBID) 100 MG Cap; Take 1 Capsule by mouth every 12 hours for 5 days.  Dispense: 10 Capsule; Refill: 0      Instructed to return to Urgent Care or nearest Emergency Department if symptoms fail to improve, for any change in " condition, further concerns, or new concerning symptoms. Patient states understanding of the plan of care and discharge instructions.    Lo Bueno M.D.

## 2025-07-18 ENCOUNTER — PATIENT MESSAGE (OUTPATIENT)
Dept: MEDICAL GROUP | Facility: CLINIC | Age: 25
End: 2025-07-18
Payer: COMMERCIAL

## 2025-07-18 DIAGNOSIS — N83.209 CYST OF OVARY, UNSPECIFIED LATERALITY: Primary | ICD-10-CM

## 2025-07-21 ENCOUNTER — PATIENT MESSAGE (OUTPATIENT)
Dept: MEDICAL GROUP | Facility: CLINIC | Age: 25
End: 2025-07-21
Payer: COMMERCIAL

## 2025-07-21 DIAGNOSIS — N83.201 CYST OF RIGHT OVARY: Primary | ICD-10-CM

## 2025-07-24 LAB
ALBUMIN SERPL-MCNC: 4.5 G/DL (ref 4–5)
ALP SERPL-CCNC: 79 IU/L (ref 44–121)
ALT SERPL-CCNC: 26 IU/L (ref 0–32)
AST SERPL-CCNC: 18 IU/L (ref 0–40)
BILIRUB SERPL-MCNC: 0.4 MG/DL (ref 0–1.2)
BUN SERPL-MCNC: 14 MG/DL (ref 6–20)
BUN/CREAT SERPL: 15 (ref 9–23)
CALCIUM SERPL-MCNC: 9.9 MG/DL (ref 8.7–10.2)
CHLORIDE SERPL-SCNC: 104 MMOL/L (ref 96–106)
CHOLEST SERPL-MCNC: 198 MG/DL (ref 100–199)
CO2 SERPL-SCNC: 21 MMOL/L (ref 20–29)
CREAT SERPL-MCNC: 0.95 MG/DL (ref 0.57–1)
EGFRCR SERPLBLD CKD-EPI 2021: 86 ML/MIN/1.73
GLOBULIN SER CALC-MCNC: 3.3 G/DL (ref 1.5–4.5)
GLUCOSE SERPL-MCNC: 85 MG/DL (ref 70–99)
HDLC SERPL-MCNC: 47 MG/DL
LDL CALC COMMENT:: ABNORMAL
LDLC SERPL CALC-MCNC: 138 MG/DL (ref 0–99)
POTASSIUM SERPL-SCNC: 4.6 MMOL/L (ref 3.5–5.2)
PROT SERPL-MCNC: 7.8 G/DL (ref 6–8.5)
SODIUM SERPL-SCNC: 138 MMOL/L (ref 134–144)
TRIGL SERPL-MCNC: 73 MG/DL (ref 0–149)
VLDLC SERPL CALC-MCNC: 13 MG/DL (ref 5–40)

## 2025-07-25 NOTE — Clinical Note
REFERRAL APPROVAL NOTICE         Sent on July 25, 2025                   Isabella Dasilva  [address]  [facility] [address]                   Dear Ms. Dasilva,    After a careful review of the medical information and benefit coverage, [facility] has processed your referral. See below for additional details.    If applicable, you must be actively enrolled with your insurance for coverage of the authorized service. If you have any questions regarding your coverage, please contact your insurance directly.    REFERRAL INFORMATION   Referral #:  [id number]  Referred-To Department    Referred-By Provider:  Gynecology    Yojana Roman P.A.-C.   [facility]      [address] [address]  [address]  [facility] [id number] [phone] [address] [place] [address], [address]  [facility] [address] [phone]    Referral Start Date:  07/22/2025  Referral End Date:   07/22/2026             SCHEDULING  If you do not already have an appointment, please call [phone] to make an appointment.     MORE INFORMATION  If you do not already have a [employer] account, sign up at: [email or web address]  You can access your medical information, make appointments, see lab results, billing information, and more.  If you have questions regarding this referral, please contact  the [facility] Referrals department at:             [phone]. Monday - Friday 8:00AM - 5:00PM.     Sincerely,    [facility]

## 2025-08-26 ENCOUNTER — HOSPITAL ENCOUNTER (OUTPATIENT)
Dept: RADIOLOGY | Facility: MEDICAL CENTER | Age: 25
End: 2025-08-26
Attending: PHYSICIAN ASSISTANT
Payer: COMMERCIAL

## 2025-08-26 DIAGNOSIS — N83.209 CYST OF OVARY, UNSPECIFIED LATERALITY: ICD-10-CM

## 2025-08-26 PROCEDURE — 76830 TRANSVAGINAL US NON-OB: CPT

## 2025-08-29 ENCOUNTER — GYNECOLOGY VISIT (OUTPATIENT)
Dept: GYNECOLOGY | Facility: CLINIC | Age: 25
End: 2025-08-29
Attending: PHYSICIAN ASSISTANT
Payer: COMMERCIAL

## 2025-08-29 VITALS
WEIGHT: 216.6 LBS | SYSTOLIC BLOOD PRESSURE: 116 MMHG | HEIGHT: 64 IN | BODY MASS INDEX: 36.98 KG/M2 | HEART RATE: 71 BPM | DIASTOLIC BLOOD PRESSURE: 88 MMHG

## 2025-08-29 DIAGNOSIS — N83.209 CYST OF OVARY, UNSPECIFIED LATERALITY: Primary | ICD-10-CM

## 2025-08-29 ASSESSMENT — LIFESTYLE VARIABLES
HOW OFTEN DO YOU HAVE SIX OR MORE DRINKS ON ONE OCCASION: NEVER
HOW OFTEN DO YOU HAVE A DRINK CONTAINING ALCOHOL: MONTHLY OR LESS
SKIP TO QUESTIONS 9-10: 1
HOW MANY STANDARD DRINKS CONTAINING ALCOHOL DO YOU HAVE ON A TYPICAL DAY: 1 OR 2
AUDIT-C TOTAL SCORE: 1

## 2025-08-29 ASSESSMENT — FIBROSIS 4 INDEX: FIB4 SCORE: 0.23
